# Patient Record
Sex: FEMALE | Race: WHITE | NOT HISPANIC OR LATINO | Employment: FULL TIME | URBAN - METROPOLITAN AREA
[De-identification: names, ages, dates, MRNs, and addresses within clinical notes are randomized per-mention and may not be internally consistent; named-entity substitution may affect disease eponyms.]

---

## 2017-03-13 ENCOUNTER — OFFICE VISIT (OUTPATIENT)
Dept: OBSTETRICS AND GYNECOLOGY | Facility: CLINIC | Age: 57
End: 2017-03-13
Payer: OTHER GOVERNMENT

## 2017-03-13 VITALS
WEIGHT: 169 LBS | DIASTOLIC BLOOD PRESSURE: 80 MMHG | SYSTOLIC BLOOD PRESSURE: 138 MMHG | HEIGHT: 65 IN | BODY MASS INDEX: 28.16 KG/M2

## 2017-03-13 DIAGNOSIS — N39.41 URGE INCONTINENCE: ICD-10-CM

## 2017-03-13 DIAGNOSIS — Z01.419 WELL WOMAN EXAM WITH ROUTINE GYNECOLOGICAL EXAM: Primary | ICD-10-CM

## 2017-03-13 PROCEDURE — 99396 PREV VISIT EST AGE 40-64: CPT | Mod: S$PBB,,, | Performed by: OBSTETRICS & GYNECOLOGY

## 2017-03-13 PROCEDURE — 88175 CYTOPATH C/V AUTO FLUID REDO: CPT

## 2017-03-13 PROCEDURE — 99213 OFFICE O/P EST LOW 20 MIN: CPT | Mod: PBBFAC | Performed by: OBSTETRICS & GYNECOLOGY

## 2017-03-13 PROCEDURE — 99999 PR PBB SHADOW E&M-EST. PATIENT-LVL III: CPT | Mod: PBBFAC,,, | Performed by: OBSTETRICS & GYNECOLOGY

## 2017-03-13 NOTE — PROGRESS NOTES
CHIEF COMPLAINT:   Gynecologic Exam  Chief Complaint   Patient presents with    Gynecologic Exam       HISTORY OF PRESENT ILLNESS  Patient presents for annual exam. The patient has several  complaints incl vaginal dryness  Urinary incontinence. Nocturia, needs to get up 4-5x a night to void, often panties are wet.   Wears daily pad.   Feels like she has vaginal flatus for a number of years since   BM sometimes solid sometimes diarrhea.    After last  , had an episiotomy - not sure what degree  Heavy lifting in the [employer].     No LMP recorded. Patient is postmenopausal.  Postmenopausal     GYN screening history: last Pap: was normal. Never had any abnormal Pap smears in past.     Reports no bowel movement irregularities from baseline, bloating, or weight loss.   HRT:   None    Last prempro was     Health Maintenance   Topic Date Due    Lipid Panel  1958    TETANUS VACCINE  1976    Mammogram  1998    Colonoscopy  2008    Influenza Vaccine  2016    Pap Smear  2017    Hepatitis C Screening  Completed       HISTORY  Patient Active Problem List   Diagnosis    DDD (degenerative disc disease), cervical       Past Medical History:   Diagnosis Date    Tinnitus of both ears        Past Surgical History:   Procedure Laterality Date    BREAST SURGERY      CARPAL TUNNEL RELEASE      both hands     tubal ligation         Family History   Problem Relation Age of Onset    COPD Sister     Cancer Maternal Uncle     Cancer Maternal Grandmother     Deep vein thrombosis Mother     Breast cancer Neg Hx     Ovarian cancer Neg Hx        Social History     Social History    Marital status:      Spouse name: N/A    Number of children: N/A    Years of education: N/A     Social History Main Topics    Smoking status: Former Smoker    Smokeless tobacco: None      Comment: guit smoking at age 27    Alcohol use Yes      Comment: 2 times monthly    Drug use: No     Sexual activity: Yes     Partners: Male     Other Topics Concern    None     Social History Narrative       Current Outpatient Prescriptions   Medication Sig Dispense Refill    estrogen, conjugated,-medroxyprogesterone 0.625-2.5mg (PREMPRO) 0.625-2.5 mg per tablet Take 1 tablet by mouth every other day. 30 tablet 6     No current facility-administered medications for this visit.        Review of patient's allergies indicates:   Allergen Reactions    Metformin Rash           PHYSICAL EXAM     Vitals:    03/13/17 1618   BP: 138/80       PAIN SCALE: 0/10 None    ROS:  GENERAL: No fever, chills, fatigability or weight loss.  ABDOMEN: Appetite fine. No weight loss. Denies diarrhea, abdominal pain, hematemesis or blood in stool.  No change in bowel movement pattern.  URINARY: No flank pain, dysuria or hematuria.  REPRODUCTIVE: No abnormal vaginal bleeding.  BREASTS: Breasts symmetric, nontender and no lumps detected.    PE:   APPEARANCE: Well nourished, well developed, in no acute distress.  NECK: Neck symmetric without masses or thyromegaly.    NODES: No inguinal lymph node enlargement.  ABDOMEN: Soft. No tenderness or masses. No hepatosplenomegaly. No hernias.  BREASTS: Symmetrical, no skin changes or visible lesions. No palpable masses, nipple discharge or adenopathy bilaterally.    PELVIC:   VULVA: No lesions. Normal female genitalia.  URETHRAL MEATUS: Normal size and location, no lesions, no prolapse.  URETHRA: No masses, tenderness, prolapse or scarring.  VAGINA: Moist and well rugated, no discharge, no significant  Rectocele. Midline cystocele -1   CERVIX: No lesions, normal diameter, no stenosis, no cervical motion tenderness.  UTERUS: 8 week size, regular shape, mobile, non-tender, normal position, mild descensus -2.  ADNEXA: No masses, tenderness or CDS nodularity.  ANUS PERINEUM: No lesions, no relaxation, external hemorrhoids noted.       DIAGNOSIS:   1. Normal gyn exam  2. Screening pap smear  3. Genital  prolapse  4. Urge incontinence w nocturia    PLAN:   Urology referal  Mammogram    Colonoscopy    MEDICATIONS PRESCRIBED:   Calcium vitamin D and weight bearing exercise    COUNSELING:  Patient was counseled today on A.C.S. Pap guidelines and recommendations for yearly pelvic exams, mammograms and monthly self breast exams; to see her PCP for other health maintenance.     FOLLOW-UP: With me in 1 year. Pap smear every 3 years.

## 2017-03-13 NOTE — MR AVS SNAPSHOT
"    [facility]  [address]  [place] [address]  Phone: [phone]  Fax: [phone]                  Yoly Hartmann   3/13/2017 3:00 PM   Office Visit    Description:  Female : 1960   Provider:  Julieta Ventura MD   Department:  [facility]           Reason for Visit     Gynecologic Exam           Diagnoses this Visit        Comments    Well woman exam with routine gynecological exam    -  Primary     Urge incontinence                To Do List           Future Appointments        Provider Department Dept Phone    5/3/2017 10:15 AM Julieta Ventura MD [facility]/ -[phone]      Goals (5 Years of Data)     None      [facility] On Call     [facility] On Call Nurse Care Line -  Assistance  Registered nurses in the [facility] On Call Center provide clinical advisement, health education, appointment booking, and other advisory services.  Call for this free service at [phone].             Medications           Message regarding Medications     Verify the changes and/or additions to your medication regime listed below are the same as discussed with your clinician today.  If any of these changes or additions are incorrect, please notify your healthcare provider.        STOP taking these medications     estrogen, conjugated,-medroxyprogesterone 0.625-2.5mg (PREMPRO) 0.625-2.5 mg per tablet Take 1 tablet by mouth every other day.           Verify that the below list of medications is an accurate representation of the medications you are currently taking.  If none reported, the list may be blank. If incorrect, please contact your healthcare provider. Carry this list with you in case of emergency.           Current Medications            Clinical Reference Information           Your Vitals Were     BP Height Weight BMI       138/80 5' 5" (1.651 m) 76.7 kg (169 lb) 28.12 kg/m2       Blood Pressure          Most Recent Value    BP  138/80      Allergies as of 3/13/2017     " Metformin      Immunizations Administered on Date of Encounter - 3/13/2017     None      Orders Placed During Today's Visit      Normal Orders This Visit    Ambulatory Referral to Urology     Liquid-based pap smear, screening     Urinalysis     Urine culture       [facility] Sign-Up     Activating your [facility] account is as easy as 1-2-3!     1) Visit [email or web address], select Sign Up Now, enter this activation code and your date of birth, then select Next.  [email or web address]  Expires: 4/27/2017  5:33 PM      2) Create a username and password to use when you visit [facility] in the future and select a security question in case you lose your password and select Next.    3) Enter your e-mail address and click Sign Up!    Additional Information  If you have questions, please e-mail [email or web address] or call [phone] to talk to our [facility] staff. Remember, [facility] is NOT to be used for urgent needs. For medical emergencies, dial 911.         Language Assistance Services     ATTENTION: Language assistance services are available, free of charge. Please call [phone].      ATENCIÓN: Si habla español, tiene a posey disposición servicios gratuitos de asistencia lingüística. Llame al [phone].     CHÚ Ý: N?u b?n nói Ti?ng Vi?t, có các d?ch v? h? tr? ngôn ng? mi?n phí dành cho b?n. G?i s? [phone].         [facility] complies with applicable Federal civil rights laws and does not discriminate on the basis of race, color, national origin, age, disability, or sex.

## 2017-03-21 NOTE — PROGRESS NOTES
Please notify patient her Pap smear is normal.   I recommend she repeats her annual exam in one year.

## 2017-03-22 ENCOUNTER — TELEPHONE (OUTPATIENT)
Dept: OBSTETRICS AND GYNECOLOGY | Facility: CLINIC | Age: 57
End: 2017-03-22

## 2017-06-05 ENCOUNTER — OFFICE VISIT (OUTPATIENT)
Dept: OBSTETRICS AND GYNECOLOGY | Facility: CLINIC | Age: 57
End: 2017-06-05
Payer: OTHER GOVERNMENT

## 2017-06-05 VITALS
WEIGHT: 173.31 LBS | HEIGHT: 65 IN | BODY MASS INDEX: 28.87 KG/M2 | DIASTOLIC BLOOD PRESSURE: 82 MMHG | SYSTOLIC BLOOD PRESSURE: 132 MMHG

## 2017-06-05 DIAGNOSIS — N39.41 URGE INCONTINENCE: Primary | ICD-10-CM

## 2017-06-05 PROCEDURE — 99999 PR PBB SHADOW E&M-EST. PATIENT-LVL III: CPT | Mod: PBBFAC,,, | Performed by: OBSTETRICS & GYNECOLOGY

## 2017-06-05 PROCEDURE — 99213 OFFICE O/P EST LOW 20 MIN: CPT | Mod: PBBFAC | Performed by: OBSTETRICS & GYNECOLOGY

## 2017-06-05 PROCEDURE — 99212 OFFICE O/P EST SF 10 MIN: CPT | Mod: S$PBB,,, | Performed by: OBSTETRICS & GYNECOLOGY

## 2017-06-05 RX ORDER — CLOBETASOL PROPIONATE 0.5 MG/G
CREAM TOPICAL
COMMUNITY
Start: 2016-12-22 | End: 2017-12-22

## 2017-06-05 RX ORDER — TROSPIUM CHLORIDE 20 MG/1
20 TABLET, FILM COATED ORAL 2 TIMES DAILY
Qty: 60 TABLET | Refills: 1 | Status: SHIPPED | OUTPATIENT
Start: 2017-06-05 | End: 2018-06-05

## 2017-06-05 RX ORDER — DULOXETIN HYDROCHLORIDE 60 MG/1
CAPSULE, DELAYED RELEASE ORAL
COMMUNITY
Start: 2017-04-26

## 2017-06-06 NOTE — PROGRESS NOTES
CHIEF COMPLAINT:   Chief Complaint   Patient presents with    Follow-up     discuss hormones, pap        HISTORY OF PRESENT ILLNESS    Yoly Hartmann 57 y.o.  complains of: urge incontinence. w nocituria and daytime frequency and urgerncy. Desires to try sanctura. No glaucoma hx.    HISTORY  Patient Active Problem List   Diagnosis    DDD (degenerative disc disease), cervical       Past Medical History:   Diagnosis Date    Tinnitus of both ears        Past Surgical History:   Procedure Laterality Date    BREAST SURGERY      CARPAL TUNNEL RELEASE      both hands     tubal ligation         Family History   Problem Relation Age of Onset    COPD Sister     Cancer Maternal Uncle     Cancer Maternal Grandmother     Deep vein thrombosis Mother     Breast cancer Neg Hx     Ovarian cancer Neg Hx        Social History     Social History    Marital status:      Spouse name: N/A    Number of children: N/A    Years of education: N/A     Social History Main Topics    Smoking status: Former Smoker    Smokeless tobacco: Never Used      Comment: guit smoking at age 27    Alcohol use Yes      Comment: 2 times monthly    Drug use: No    Sexual activity: Yes     Partners: Male     Other Topics Concern    None     Social History Narrative    None       Current Outpatient Prescriptions   Medication Sig Dispense Refill    clobetasol (TEMOVATE) 0.05 % cream Apply to the affected area twice daily.  Do not apply to face, underarms, or groin.      duloxetine (CYMBALTA) 60 MG capsule One daily      trospium (SANCTURA) 20 mg Tab tablet Take 1 tablet (20 mg total) by mouth 2 (two) times daily. 60 tablet 1     No current facility-administered medications for this visit.        Review of patient's allergies indicates:   Allergen Reactions    Metformin Rash           PHYSICAL EXAM     Vitals:    17 1017   BP: 132/82       PAIN SCALE: 0/10 None    ROS:  GENERAL: No fever, chills, fatigability or weight  loss.  ABDOMEN: Appetite fine. No weight loss. Denies diarrhea, abdominal pain, hematemesis or blood in stool.  URINARY: No flank pain, dysuria or hematuria.  REPRODUCTIVE: No abnormal vaginal bleeding.   BREASTS: Breasts symmetric, nontender and no lumps detected.    PE:   APPEARANCE: Well nourished, well developed, in no acute distress.        DIAGNOSIS:   1.urge incontinence  2. cystocele      PLAN:   2-3mo of sanctura - if does nothave complete resolution and has signs of stress incontinence, then may consider surgical correction. However discussed most benefit would be rendered with hysterectomy w lowest rate of recurrence of incontinence.         COUNSELING:  Patient was counseled today on A.C.S. Pap guidelines and recommendations for yearly pelvic exams, mammograms and monthly self breast exams; to see her PCP for other health maintenance.     FOLLOW-UP: With me annual and as above.

## 2017-09-25 ENCOUNTER — OFFICE VISIT (OUTPATIENT)
Dept: OBSTETRICS AND GYNECOLOGY | Facility: CLINIC | Age: 57
End: 2017-09-25
Payer: OTHER GOVERNMENT

## 2017-09-25 VITALS
SYSTOLIC BLOOD PRESSURE: 160 MMHG | HEIGHT: 65 IN | BODY MASS INDEX: 28.66 KG/M2 | DIASTOLIC BLOOD PRESSURE: 90 MMHG | WEIGHT: 172 LBS

## 2017-09-25 DIAGNOSIS — N39.490 OVERFLOW INCONTINENCE: Primary | ICD-10-CM

## 2017-09-25 PROCEDURE — 99213 OFFICE O/P EST LOW 20 MIN: CPT | Mod: S$PBB,,, | Performed by: OBSTETRICS & GYNECOLOGY

## 2017-09-25 PROCEDURE — 99213 OFFICE O/P EST LOW 20 MIN: CPT | Mod: PBBFAC | Performed by: OBSTETRICS & GYNECOLOGY

## 2017-09-25 PROCEDURE — 87086 URINE CULTURE/COLONY COUNT: CPT

## 2017-09-25 PROCEDURE — 99999 PR PBB SHADOW E&M-EST. PATIENT-LVL III: CPT | Mod: PBBFAC,,, | Performed by: OBSTETRICS & GYNECOLOGY

## 2017-09-25 RX ORDER — CELECOXIB 200 MG/1
200 CAPSULE ORAL
COMMUNITY
Start: 2017-08-07

## 2017-09-25 RX ORDER — BUSPIRONE HYDROCHLORIDE 15 MG/1
TABLET ORAL
COMMUNITY
Start: 2017-09-22

## 2017-09-25 RX ORDER — VALSARTAN 160 MG/1
160 TABLET ORAL
COMMUNITY
Start: 2017-08-11 | End: 2018-08-11

## 2017-09-25 RX ORDER — TESTOSTERONE GEL, 1% 10 MG/G
1 GEL TRANSDERMAL DAILY
Qty: 5 G | Refills: 5 | Status: SHIPPED | OUTPATIENT
Start: 2017-09-25 | End: 2018-03-26

## 2017-09-25 RX ORDER — ARIPIPRAZOLE 5 MG/1
5 TABLET ORAL
COMMUNITY
Start: 2017-09-22 | End: 2017-10-22

## 2017-09-25 NOTE — PROGRESS NOTES
CHIEF COMPLAINT:   Gynecologic Exam      Chief Complaint   Patient presents with    Gynecologic Exam         HISTORY OF PRESENT ILLNESS    Yoly Hartmann  presents c/o continued urinary incontinence despite a trial of sanctura.      Nocturia is a big problem, needs to get up 4-5x a night to void, often panties are already wet.   Has incontinence during intercourse.   Wears daily pad. Will have stress incontinence, however most concerning is when she feels the signal to urinate, she floods soon after, without the 'gotta go' sensation. She often wears multiple pads daily and will soak through them randomly.   Feels like she has vaginal flatus for a number of years since   BM sometimes solid sometimes diarrhea. Has occasional stool incontinence.      After last  , had an episiotomy - not sure what degree  Heavy lifting in the [employer].        No LMP recorded. Patient is postmenopausal.   GYN screening history: last Pap: was normal. Never had any abnormal Pap smears in past.      HRT:   None    Last prempro was   C/o severe lack of libido and desires to try testosterone cream at t this time. Understands risks.          Health Maintenance   Topic Date Due    Lipid Panel  1958    TETANUS VACCINE  1976    Mammogram  1998    Colonoscopy  2008    Influenza Vaccine  2016    Pap Smear  2017    Hepatitis C Screening  Completed         HISTORY      Patient Active Problem List   Diagnosis    DDD (degenerative disc disease), cervical              Past Medical History:   Diagnosis Date    Tinnitus of both ears                 Past Surgical History:   Procedure Laterality Date    BREAST SURGERY        CARPAL TUNNEL RELEASE         both hands     tubal ligation                   Family History   Problem Relation Age of Onset    COPD Sister      Cancer Maternal Uncle      Cancer Maternal Grandmother      Deep vein thrombosis Mother      Breast cancer Neg Hx       "Ovarian cancer Neg Hx           Social History            Social History    Marital status:        Spouse name: N/A    Number of children: N/A    Years of education: N/A              Social History Main Topics    Smoking status: Former Smoker    Smokeless tobacco: None         Comment: guit smoking at age 27    Alcohol use Yes          Comment: 2 times monthly    Drug use: No    Sexual activity: Yes       Partners: Male           Other Topics Concern    None      Social History Narrative         Current Medications          Current Outpatient Prescriptions   Medication Sig Dispense Refill    estrogen, conjugated,-medroxyprogesterone 0.625-2.5mg (PREMPRO) 0.625-2.5 mg per tablet Take 1 tablet by mouth every other day. 30 tablet 6      No current facility-administered medications for this visit.                  Review of patient's allergies indicates:   Allergen Reactions    Metformin Rash               PHYSICAL EXAM      Vitals:    09/25/17 1426   BP: (!) 160/90   Weight: 78 kg (172 lb)   Height: 5' 5" (1.651 m)    repeat 144/92    PAIN SCALE: 0/10 None    ROS:  GENERAL: No fever, chills, fatigability or weight loss.  ABDOMEN: Appetite fine. No weight loss. Denies diarrhea, abdominal pain, hematemesis or blood in stool.  No change in bowel movement pattern.  URINARY: No flank pain, dysuria or hematuria.  REPRODUCTIVE: No abnormal vaginal bleeding.  BREASTS: Breasts symmetric, nontender and no lumps detected.    PE:   APPEARANCE: Well nourished, well developed, in no acute distress.  NECK: Neck symmetric without masses or thyromegaly.    NODES: No inguinal lymph node enlargement.  ABDOMEN: Soft. No tenderness or masses. No hepatosplenomegaly. No hernias.  BREASTS: Symmetrical, no skin changes or visible lesions. No palpable masses, nipple discharge or adenopathy bilaterally.    PELVIC:   VULVA: No lesions. Normal female genitalia.  URETHRAL MEATUS: Normal size and location, no lesions, no " prolapse.  URETHRA: No masses, tenderness, prolapse or scarring.  VAGINA: Moist and well rugated, no discharge, no significant  Rectocele. Midline cystocele -1   CERVIX: No lesions, normal diameter, no stenosis, no cervical motion tenderness.  UTERUS: 8 week size, regular shape, mobile, non-tender, normal position, mild descensus -2.  ADNEXA: No masses, tenderness or CDS nodularity.  ANUS PERINEUM: No lesions, no relaxation, external hemorrhoids noted.       DIAGNOSIS:   1. Genital prolapse  2.  Nocturia  3. Suspected overflow incontinence - failed trial of anticholinergic      PLAN:   Urology referal

## 2017-09-27 LAB — BACTERIA UR CULT: NORMAL

## 2018-02-08 ENCOUNTER — TELEPHONE (OUTPATIENT)
Dept: OBSTETRICS AND GYNECOLOGY | Facility: CLINIC | Age: 58
End: 2018-02-08

## 2018-02-08 NOTE — TELEPHONE ENCOUNTER
----- Message from Venkatesh Bhandari sent at 2/8/2018 12:34 PM CST -----  Contact: Pt  Please give pt a call at ..[phone] (home) [phone] (work) she is requesting a copy of her last pap smear results be faxed over [phone]